# Patient Record
Sex: FEMALE | URBAN - METROPOLITAN AREA
[De-identification: names, ages, dates, MRNs, and addresses within clinical notes are randomized per-mention and may not be internally consistent; named-entity substitution may affect disease eponyms.]

---

## 2023-01-16 ENCOUNTER — APPOINTMENT (OUTPATIENT)
Dept: URBAN - METROPOLITAN AREA SURGERY 9 | Age: 73
Setting detail: DERMATOLOGY
End: 2023-01-16

## 2023-01-16 PROBLEM — C44.42 SQUAMOUS CELL CARCINOMA OF SKIN OF SCALP AND NECK: Status: ACTIVE | Noted: 2023-01-16

## 2023-01-16 PROCEDURE — OTHER MIPS QUALITY: OTHER

## 2023-01-16 PROCEDURE — OTHER CONSULTATION FOR MOHS SURGERY: OTHER

## 2023-01-16 PROCEDURE — 17311 MOHS 1 STAGE H/N/HF/G: CPT

## 2023-01-16 PROCEDURE — 17312 MOHS ADDL STAGE: CPT

## 2023-01-16 PROCEDURE — OTHER MOHS SURGERY: OTHER

## 2023-01-16 PROCEDURE — OTHER RETURN TO REFERRING PROVIDER: OTHER

## 2023-01-16 NOTE — PROCEDURE: CONSULTATION FOR MOHS SURGERY
Detail Level: Detailed
Body Location Override (Optional - Billing Will Still Be Based On Selected Body Map Location If Applicable): Mid parietal scalp
X Size Of Lesion In Cm (Optional): 0
Name Of The Referring Provider For Procedure: Damari
Incorporate Mauc In Note: No

## 2023-01-16 NOTE — PROCEDURE: MOHS SURGERY
Body Location Override (Optional - Billing Will Still Be Based On Selected Body Map Location If Applicable): mid-parietal scalp

## 2023-01-16 NOTE — HPI: MOHS SURGERY CONSULTATION
Has The Cancer Been Biopsied Before?: has been previously biopsied
Body Location Override (Optional): Mid parietal scalp
Who Is Your Referring Provider?: Damari
When Was Your Biopsy?: 12/1/22

## 2023-01-16 NOTE — PROCEDURE: MOHS SURGERY
Detail Level: Detailed Patient Specific Counseling (Will Not Stick From Patient To Patient): Mother and daughter concerned about several lesions on patients face. She has no pertinent history of skin cancer or DNs in her own or family past medical history. These lesions do not appear worrisome. We discussed available options. I do not recommend biopsy to rule out atypia at this time. Photos and measurements were taken. All lesions are under 5 mm. Informational pamphlet given to mother. They will return for a skin check in three months. If changes are apparent may consider biopsy. We discussed post biopsy scarring. If no worrisome change patient may choose to have yearly skin checks. Dressing: pressure dressing with telfa

## 2023-01-16 NOTE — PROCEDURE: MOHS SURGERY
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 Mitoses and atypical forms are evident. Intercellular bridge and keratin rodriguez formation are evident.

## 2023-01-16 NOTE — PROCEDURE: MOHS SURGERY
Spoke with patient to stop the medication he verbally understood Special Stains Stage 1 - Results: Base On Clearance Noted Above